# Patient Record
Sex: FEMALE | URBAN - METROPOLITAN AREA
[De-identification: names, ages, dates, MRNs, and addresses within clinical notes are randomized per-mention and may not be internally consistent; named-entity substitution may affect disease eponyms.]

---
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
## 2022-03-30 ENCOUNTER — OFFICE VISIT (OUTPATIENT)
Dept: URBAN - METROPOLITAN AREA CLINIC 76 | Facility: CLINIC | Age: 22
End: 2022-03-30
Payer: COMMERCIAL

## 2022-03-30 DIAGNOSIS — H52.03 HYPERMETROPIA, BILATERAL: Primary | ICD-10-CM

## 2022-03-30 DIAGNOSIS — H51.12 CONVERGENCE EXCESS: ICD-10-CM

## 2022-03-30 PROCEDURE — 92014 COMPRE OPH EXAM EST PT 1/>: CPT | Performed by: OPTOMETRIST

## 2022-03-30 ASSESSMENT — KERATOMETRY
OD: 42.50
OS: 42.88

## 2022-03-30 ASSESSMENT — VISUAL ACUITY
OD: 20/20
OS: 20/25

## 2022-03-30 ASSESSMENT — INTRAOCULAR PRESSURE
OD: 18
OS: 18

## 2022-03-30 NOTE — IMPRESSION/PLAN
Impression: Hypermetropia, bilateral: H52.03.
 Plan: A glasses prescription has been discussed and generated. Patient to call with any concerns.

## 2022-03-30 NOTE — IMPRESSION/PLAN
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 Plan: Recommend SV reading glasses only. Bifocals not recommended.